# Patient Record
Sex: FEMALE | Race: WHITE | NOT HISPANIC OR LATINO | URBAN - METROPOLITAN AREA
[De-identification: names, ages, dates, MRNs, and addresses within clinical notes are randomized per-mention and may not be internally consistent; named-entity substitution may affect disease eponyms.]

---

## 2019-02-05 ENCOUNTER — OFFICE (OUTPATIENT)
Dept: URBAN - METROPOLITAN AREA CLINIC 11 | Facility: CLINIC | Age: 54
End: 2019-02-05

## 2019-02-05 VITALS
SYSTOLIC BLOOD PRESSURE: 123 MMHG | HEIGHT: 67 IN | HEART RATE: 87 BPM | DIASTOLIC BLOOD PRESSURE: 63 MMHG | WEIGHT: 217 LBS

## 2019-02-05 DIAGNOSIS — K92.1 MELENA: ICD-10-CM

## 2019-02-05 DIAGNOSIS — K59.09 OTHER CONSTIPATION: ICD-10-CM

## 2019-02-05 DIAGNOSIS — Z86.010 PERSONAL HISTORY OF COLONIC POLYPS: ICD-10-CM

## 2019-02-05 DIAGNOSIS — K62.89 OTHER SPECIFIED DISEASES OF ANUS AND RECTUM: ICD-10-CM

## 2019-02-05 LAB
ANEMIA PROFILE A: BASO (ABSOLUTE): 0 X10E3/UL (ref 0–0.2)
ANEMIA PROFILE A: BASOS: 0 %
ANEMIA PROFILE A: EOS (ABSOLUTE): 0.2 X10E3/UL (ref 0–0.4)
ANEMIA PROFILE A: EOS: 3 %
ANEMIA PROFILE A: HEMATOCRIT: 42.9 % (ref 34–46.6)
ANEMIA PROFILE A: HEMOGLOBIN: 14.5 G/DL (ref 11.1–15.9)
ANEMIA PROFILE A: IMMATURE GRANS (ABS): 0 X10E3/UL (ref 0–0.1)
ANEMIA PROFILE A: IMMATURE GRANULOCYTES: 0 %
ANEMIA PROFILE A: IRON BIND.CAP.(TIBC): 370 UG/DL (ref 250–450)
ANEMIA PROFILE A: IRON SATURATION: 18 % (ref 15–55)
ANEMIA PROFILE A: IRON: 65 UG/DL (ref 27–159)
ANEMIA PROFILE A: LYMPHS (ABSOLUTE): 1.7 X10E3/UL (ref 0.7–3.1)
ANEMIA PROFILE A: LYMPHS: 25 %
ANEMIA PROFILE A: MCH: 30.1 PG (ref 26.6–33)
ANEMIA PROFILE A: MCHC: 33.8 G/DL (ref 31.5–35.7)
ANEMIA PROFILE A: MCV: 89 FL (ref 79–97)
ANEMIA PROFILE A: MONOCYTES(ABSOLUTE): 0.5 X10E3/UL (ref 0.1–0.9)
ANEMIA PROFILE A: MONOCYTES: 7 %
ANEMIA PROFILE A: NEUTROPHILS (ABSOLUTE): 4.4 X10E3/UL (ref 1.4–7)
ANEMIA PROFILE A: NEUTROPHILS: 65 %
ANEMIA PROFILE A: PLATELETS: 230 X10E3/UL (ref 150–379)
ANEMIA PROFILE A: RBC: 4.82 X10E6/UL (ref 3.77–5.28)
ANEMIA PROFILE A: RDW: 13.6 % (ref 12.3–15.4)
ANEMIA PROFILE A: RETICULOCYTE COUNT: 0.9 % (ref 0.6–2.6)
ANEMIA PROFILE A: UIBC: 305 UG/DL (ref 131–425)
ANEMIA PROFILE A: WBC: 6.8 X10E3/UL (ref 3.4–10.8)
TSH: 2.85 UIU/ML (ref 0.45–4.5)

## 2019-02-05 PROCEDURE — 99204 OFFICE O/P NEW MOD 45 MIN: CPT | Performed by: INTERNAL MEDICINE

## 2019-02-05 RX ORDER — DOCUSATE SODIUM 100 MG
100 CAPSULE ORAL
Qty: 30 | Refills: 5 | Status: ACTIVE
Start: 2019-02-05

## 2019-02-05 RX ORDER — SODIUM PICOSULFATE, MAGNESIUM OXIDE, AND ANHYDROUS CITRIC ACID 10; 3.5; 12 MG/160ML; G/160ML; G/160ML
LIQUID ORAL
Qty: 1 | Refills: 0 | Status: COMPLETED
Start: 2019-02-05 | End: 2019-04-22

## 2019-04-22 ENCOUNTER — AMBULATORY SURGICAL CENTER (OUTPATIENT)
Dept: URBAN - METROPOLITAN AREA SURGERY 3 | Facility: SURGERY | Age: 54
End: 2019-04-22
Payer: COMMERCIAL

## 2019-04-22 ENCOUNTER — OFFICE (OUTPATIENT)
Dept: URBAN - METROPOLITAN AREA PATHOLOGY 22 | Facility: PATHOLOGY | Age: 54
End: 2019-04-22
Payer: COMMERCIAL

## 2019-04-22 VITALS
DIASTOLIC BLOOD PRESSURE: 58 MMHG | HEIGHT: 67 IN | OXYGEN SATURATION: 96 % | OXYGEN SATURATION: 97 % | DIASTOLIC BLOOD PRESSURE: 69 MMHG | DIASTOLIC BLOOD PRESSURE: 54 MMHG | DIASTOLIC BLOOD PRESSURE: 58 MMHG | RESPIRATION RATE: 21 BRPM | HEART RATE: 72 BPM | OXYGEN SATURATION: 95 % | HEART RATE: 73 BPM | SYSTOLIC BLOOD PRESSURE: 90 MMHG | HEART RATE: 74 BPM | RESPIRATION RATE: 20 BRPM | SYSTOLIC BLOOD PRESSURE: 100 MMHG | SYSTOLIC BLOOD PRESSURE: 104 MMHG | SYSTOLIC BLOOD PRESSURE: 104 MMHG | TEMPERATURE: 97.4 F | WEIGHT: 214 LBS | HEART RATE: 73 BPM | DIASTOLIC BLOOD PRESSURE: 59 MMHG | OXYGEN SATURATION: 96 % | TEMPERATURE: 96.9 F | OXYGEN SATURATION: 97 % | RESPIRATION RATE: 22 BRPM | RESPIRATION RATE: 22 BRPM | HEART RATE: 74 BPM | RESPIRATION RATE: 21 BRPM | OXYGEN SATURATION: 98 % | DIASTOLIC BLOOD PRESSURE: 54 MMHG | DIASTOLIC BLOOD PRESSURE: 46 MMHG | TEMPERATURE: 97.4 F | OXYGEN SATURATION: 98 % | DIASTOLIC BLOOD PRESSURE: 69 MMHG | SYSTOLIC BLOOD PRESSURE: 133 MMHG | WEIGHT: 214 LBS | HEART RATE: 72 BPM | SYSTOLIC BLOOD PRESSURE: 101 MMHG | SYSTOLIC BLOOD PRESSURE: 133 MMHG | RESPIRATION RATE: 20 BRPM | HEIGHT: 67 IN | HEART RATE: 70 BPM | SYSTOLIC BLOOD PRESSURE: 101 MMHG | SYSTOLIC BLOOD PRESSURE: 100 MMHG | OXYGEN SATURATION: 95 % | RESPIRATION RATE: 16 BRPM | RESPIRATION RATE: 16 BRPM | DIASTOLIC BLOOD PRESSURE: 46 MMHG | HEART RATE: 70 BPM | TEMPERATURE: 96.9 F | DIASTOLIC BLOOD PRESSURE: 59 MMHG | SYSTOLIC BLOOD PRESSURE: 90 MMHG

## 2019-04-22 DIAGNOSIS — D12.5 BENIGN NEOPLASM OF SIGMOID COLON: ICD-10-CM

## 2019-04-22 DIAGNOSIS — K63.89 OTHER SPECIFIED DISEASES OF INTESTINE: ICD-10-CM

## 2019-04-22 DIAGNOSIS — K64.0 FIRST DEGREE HEMORRHOIDS: ICD-10-CM

## 2019-04-22 DIAGNOSIS — Z86.010 PERSONAL HISTORY OF COLONIC POLYPS: ICD-10-CM

## 2019-04-22 PROCEDURE — 45380 COLONOSCOPY AND BIOPSY: CPT | Mod: 33 | Performed by: INTERNAL MEDICINE

## 2019-04-22 PROCEDURE — 88305 TISSUE EXAM BY PATHOLOGIST: CPT | Performed by: INTERNAL MEDICINE

## 2019-04-22 PROCEDURE — 88342 IMHCHEM/IMCYTCHM 1ST ANTB: CPT | Performed by: INTERNAL MEDICINE

## 2019-04-22 NOTE — SERVICEHPINOTES
54 yo WF With history of tubular adenomas in 2015 here for surveillance colonoscopy.BRSeen by surg onc for an anal nodule and possible fistula, has seton in place.

## 2019-04-22 NOTE — SERVICEHPINOTES
52 yo WF With history of tubular adenomas in 2015 here for surveillance colonoscopy.BRSeen by surg onc for an anal nodule and possible fistula, has seton in place.

## 2024-08-27 PROBLEM — K63.5 POLYP OF COLON: Status: ACTIVE | Noted: 2024-08-27

## 2024-08-27 PROBLEM — K63.89 OTHER SPECIFIED DISEASES OF INTESTINE: Status: ACTIVE | Noted: 2024-08-27
